# Patient Record
Sex: MALE | Race: WHITE | Employment: FULL TIME | URBAN - METROPOLITAN AREA
[De-identification: names, ages, dates, MRNs, and addresses within clinical notes are randomized per-mention and may not be internally consistent; named-entity substitution may affect disease eponyms.]

---

## 2017-03-28 ENCOUNTER — OFFICE VISIT (OUTPATIENT)
Dept: FAMILY MEDICINE CLINIC | Facility: CLINIC | Age: 52
End: 2017-03-28

## 2017-03-28 VITALS
WEIGHT: 189.38 LBS | BODY MASS INDEX: 25.65 KG/M2 | DIASTOLIC BLOOD PRESSURE: 70 MMHG | SYSTOLIC BLOOD PRESSURE: 110 MMHG | HEART RATE: 72 BPM | RESPIRATION RATE: 16 BRPM | HEIGHT: 72 IN | TEMPERATURE: 98 F

## 2017-03-28 DIAGNOSIS — H91.92 DECREASED HEARING, LEFT: Primary | ICD-10-CM

## 2017-03-28 DIAGNOSIS — H66.002 ACUTE SUPPURATIVE OTITIS MEDIA OF LEFT EAR WITHOUT SPONTANEOUS RUPTURE OF TYMPANIC MEMBRANE, RECURRENCE NOT SPECIFIED: ICD-10-CM

## 2017-03-28 DIAGNOSIS — H65.92 OME (OTITIS MEDIA WITH EFFUSION), LEFT: ICD-10-CM

## 2017-03-28 PROCEDURE — 99213 OFFICE O/P EST LOW 20 MIN: CPT | Performed by: FAMILY MEDICINE

## 2017-03-28 RX ORDER — AMOXICILLIN AND CLAVULANATE POTASSIUM 875; 125 MG/1; MG/1
1 TABLET, FILM COATED ORAL 2 TIMES DAILY
Qty: 14 TABLET | Refills: 0 | Status: SHIPPED | OUTPATIENT
Start: 2017-03-28 | End: 2017-04-04

## 2017-03-28 NOTE — PROGRESS NOTES
HPI:   Sindhu Frances is a 46year old male who presents for upper respiratory symptoms for 10 days. Symptoms include started with irritation and itching in the left ear, then got stuffy and when he blew his nose he felt and heard \"gurgling\" in his ear.  Richard Rodegrs adenopathy    ASSESSMENT AND PLAN:   Sindhu Frances is a 46year old male who presents with L decreased hearing d/t L OME with 1/2 AOM. PLAN:   Treat with abx; otc sudafed x 1 weeks, flonase x 2 weeks; let us know if not resolved in 2 weeks;    Rec pt schedu

## 2017-07-19 ENCOUNTER — OFFICE VISIT (OUTPATIENT)
Dept: FAMILY MEDICINE CLINIC | Facility: CLINIC | Age: 52
End: 2017-07-19

## 2017-07-19 VITALS
TEMPERATURE: 98 F | RESPIRATION RATE: 16 BRPM | WEIGHT: 184 LBS | HEART RATE: 72 BPM | SYSTOLIC BLOOD PRESSURE: 120 MMHG | HEIGHT: 72 IN | DIASTOLIC BLOOD PRESSURE: 84 MMHG | BODY MASS INDEX: 24.92 KG/M2

## 2017-07-19 DIAGNOSIS — Z72.0 TOBACCO USE: ICD-10-CM

## 2017-07-19 DIAGNOSIS — N52.9 ERECTILE DYSFUNCTION, UNSPECIFIED ERECTILE DYSFUNCTION TYPE: ICD-10-CM

## 2017-07-19 DIAGNOSIS — K92.1 HEMATOCHEZIA: ICD-10-CM

## 2017-07-19 DIAGNOSIS — Z00.00 WELL ADULT EXAM: Primary | ICD-10-CM

## 2017-07-19 DIAGNOSIS — D22.9 ATYPICAL MOLE: ICD-10-CM

## 2017-07-19 PROCEDURE — 99396 PREV VISIT EST AGE 40-64: CPT | Performed by: FAMILY MEDICINE

## 2017-07-19 NOTE — PROGRESS NOTES
Shelia Garber is a 46year old male. CC:  Patient presents with:   Well Adult: per pt      HPI:  Yearly PX  Last lipid: > 1 year    Immunization History  Administered            Date(s) Administered    TDAP                  01/23/2012     Last PSA: apparent distress  EYE: B conjunctiva and lids normal  HENT: normocephalic; normal nose, pharynx and TM's  NECK: No lymphadenopathy, thyromegaly or masses  CAR: S1, S2 normal, RRR; no S3, no S4; no click; murmur negative  PULM: clear to auscultation B, no Lipid Panel [E]      PSA (Screening) [E]    None    Meds & Refills for this Visit:  No prescriptions requested or ordered in this encounter      No Follow-up on file.                 Authorized by Williams Santana M.D.

## 2017-07-26 ENCOUNTER — NURSE ONLY (OUTPATIENT)
Dept: FAMILY MEDICINE CLINIC | Facility: CLINIC | Age: 52
End: 2017-07-26

## 2017-07-26 DIAGNOSIS — N52.9 ERECTILE DYSFUNCTION, UNSPECIFIED ERECTILE DYSFUNCTION TYPE: ICD-10-CM

## 2017-07-26 DIAGNOSIS — Z00.00 WELL ADULT EXAM: ICD-10-CM

## 2017-07-26 LAB
ALBUMIN SERPL-MCNC: 3.8 G/DL (ref 3.5–4.8)
ALP LIVER SERPL-CCNC: 116 U/L (ref 45–117)
ALT SERPL-CCNC: 27 U/L (ref 17–63)
AST SERPL-CCNC: 20 U/L (ref 15–41)
BILIRUB SERPL-MCNC: 1 MG/DL (ref 0.1–2)
BUN BLD-MCNC: 13 MG/DL (ref 8–20)
CALCIUM BLD-MCNC: 9.1 MG/DL (ref 8.3–10.3)
CHLORIDE: 111 MMOL/L (ref 101–111)
CHOLEST SMN-MCNC: 143 MG/DL (ref ?–200)
CO2: 27 MMOL/L (ref 22–32)
COMPLEXED PSA SERPL-MCNC: 0.55 NG/ML (ref 0.01–4)
CREAT BLD-MCNC: 0.81 MG/DL (ref 0.7–1.3)
ERYTHROCYTE [DISTWIDTH] IN BLOOD BY AUTOMATED COUNT: 13.4 % (ref 11.5–16)
GLUCOSE BLD-MCNC: 76 MG/DL (ref 70–99)
HCT VFR BLD AUTO: 43.5 % (ref 37–53)
HDLC SERPL-MCNC: 35 MG/DL (ref 45–?)
HDLC SERPL: 4.09 {RATIO} (ref ?–4.97)
HGB BLD-MCNC: 16 G/DL (ref 13–17)
LDLC SERPL CALC-MCNC: 81 MG/DL (ref ?–130)
LDLC SERPL-MCNC: 27 MG/DL (ref 5–40)
M PROTEIN MFR SERPL ELPH: 6.7 G/DL (ref 6.1–8.3)
MCH RBC QN AUTO: 34.3 PG (ref 27–33.2)
MCHC RBC AUTO-ENTMCNC: 36.8 G/DL (ref 31–37)
MCV RBC AUTO: 93.1 FL (ref 80–99)
NONHDLC SERPL-MCNC: 108 MG/DL (ref ?–130)
PLATELET # BLD AUTO: 266 10(3)UL (ref 150–450)
POTASSIUM SERPL-SCNC: 3.8 MMOL/L (ref 3.6–5.1)
RBC # BLD AUTO: 4.67 X10(6)UL (ref 4.3–5.7)
RED CELL DISTRIBUTION WIDTH-SD: 45.2 FL (ref 35.1–46.3)
SODIUM SERPL-SCNC: 145 MMOL/L (ref 136–144)
TRIGLYCERIDES: 134 MG/DL (ref ?–150)
TSI SER-ACNC: 2.17 MIU/ML (ref 0.35–5.5)
WBC # BLD AUTO: 11.2 X10(3) UL (ref 4–13)

## 2017-07-26 PROCEDURE — 80050 GENERAL HEALTH PANEL: CPT | Performed by: FAMILY MEDICINE

## 2017-07-26 PROCEDURE — 80061 LIPID PANEL: CPT | Performed by: FAMILY MEDICINE

## 2017-07-26 PROCEDURE — 36415 COLL VENOUS BLD VENIPUNCTURE: CPT | Performed by: FAMILY MEDICINE

## 2017-07-26 PROCEDURE — 84153 ASSAY OF PSA TOTAL: CPT | Performed by: FAMILY MEDICINE

## 2017-08-22 ENCOUNTER — OFFICE VISIT (OUTPATIENT)
Dept: FAMILY MEDICINE CLINIC | Facility: CLINIC | Age: 52
End: 2017-08-22

## 2017-08-22 VITALS
RESPIRATION RATE: 18 BRPM | HEIGHT: 73 IN | HEART RATE: 80 BPM | DIASTOLIC BLOOD PRESSURE: 70 MMHG | BODY MASS INDEX: 24.57 KG/M2 | WEIGHT: 185.38 LBS | SYSTOLIC BLOOD PRESSURE: 120 MMHG | TEMPERATURE: 98 F

## 2017-08-22 DIAGNOSIS — K92.1 HEMATOCHEZIA: Primary | ICD-10-CM

## 2017-08-22 PROCEDURE — 99243 OFF/OP CNSLTJ NEW/EST LOW 30: CPT | Performed by: SURGERY

## 2017-08-22 NOTE — H&P
Wilmer Player is a 46year old male  Patient presents with:  Consult: consult for colonoscopy . . room 5       REFERRED BY    Patient presents with    BRBPR and hematochezia for the past two years  Pt sees blood   Weight loss   Family h/o of colon polyps bleeding  Endocrine: no weight gain or loss no hot or cold intolerance    EXAM     Blood pressure 120/70, pulse 80, temperature 98.2 °F (36.8 °C), temperature source Temporal, resp. rate 18, height 73\", weight 185 lb 6 oz.   GENERAL: well developed, well n - 53.0 % Final   • PLT 07/26/2017 266.0  150.0 - 450.0 10(3)uL Final   • MCV 07/26/2017 93.1  80.0 - 99.0 fL Final   • MCH 07/26/2017 34.3* 27.0 - 33.2 pg Final   • MCHC 07/26/2017 36.8  31.0 - 37.0 g/dL Final   • RDW 07/26/2017 13.4  11.5 - 16.0 % Final

## 2017-08-23 ENCOUNTER — TELEPHONE (OUTPATIENT)
Dept: SURGERY | Facility: CLINIC | Age: 52
End: 2017-08-23

## 2017-08-23 ENCOUNTER — TELEPHONE (OUTPATIENT)
Dept: FAMILY MEDICINE CLINIC | Facility: CLINIC | Age: 52
End: 2017-08-23

## 2017-08-23 DIAGNOSIS — K92.1 HEMATOCHEZIA: Primary | ICD-10-CM

## 2017-08-23 NOTE — TELEPHONE ENCOUNTER
CPT:  55850  DX:  K92.1    I called bc/bs and spoke with Padmini Buchanan. There is NO PRIOR AUTH REQUIRED FOR OUTPATIENT COLONOSCOPY.     Ref Number:  [id number]

## 2017-08-25 RX ORDER — DOXEPIN HYDROCHLORIDE 50 MG/1
1 CAPSULE ORAL DAILY
COMMUNITY

## 2017-09-13 ENCOUNTER — ANESTHESIA EVENT (OUTPATIENT)
Dept: ENDOSCOPY | Facility: HOSPITAL | Age: 52
End: 2017-09-13
Payer: COMMERCIAL

## 2017-09-13 ENCOUNTER — SURGERY (OUTPATIENT)
Age: 52
End: 2017-09-13

## 2017-09-13 ENCOUNTER — ANESTHESIA (OUTPATIENT)
Dept: ENDOSCOPY | Facility: HOSPITAL | Age: 52
End: 2017-09-13
Payer: COMMERCIAL

## 2017-09-13 ENCOUNTER — HOSPITAL ENCOUNTER (OUTPATIENT)
Facility: HOSPITAL | Age: 52
Setting detail: HOSPITAL OUTPATIENT SURGERY
Discharge: HOME | End: 2017-09-13
Attending: SURGERY | Admitting: SURGERY
Payer: COMMERCIAL

## 2017-09-13 VITALS
SYSTOLIC BLOOD PRESSURE: 132 MMHG | WEIGHT: 175 LBS | BODY MASS INDEX: 23.7 KG/M2 | HEIGHT: 72 IN | OXYGEN SATURATION: 100 % | HEART RATE: 66 BPM | DIASTOLIC BLOOD PRESSURE: 83 MMHG | RESPIRATION RATE: 18 BRPM | TEMPERATURE: 98 F

## 2017-09-13 DIAGNOSIS — K92.1 HEMATOCHEZIA: ICD-10-CM

## 2017-09-13 PROCEDURE — 0DBH8ZX EXCISION OF CECUM, VIA NATURAL OR ARTIFICIAL OPENING ENDOSCOPIC, DIAGNOSTIC: ICD-10-PCS | Performed by: SURGERY

## 2017-09-13 PROCEDURE — 0DBN8ZX EXCISION OF SIGMOID COLON, VIA NATURAL OR ARTIFICIAL OPENING ENDOSCOPIC, DIAGNOSTIC: ICD-10-PCS | Performed by: SURGERY

## 2017-09-13 PROCEDURE — 88305 TISSUE EXAM BY PATHOLOGIST: CPT | Performed by: SURGERY

## 2017-09-13 PROCEDURE — 0DBK8ZX EXCISION OF ASCENDING COLON, VIA NATURAL OR ARTIFICIAL OPENING ENDOSCOPIC, DIAGNOSTIC: ICD-10-PCS | Performed by: SURGERY

## 2017-09-13 RX ORDER — SODIUM CHLORIDE, SODIUM LACTATE, POTASSIUM CHLORIDE, CALCIUM CHLORIDE 600; 310; 30; 20 MG/100ML; MG/100ML; MG/100ML; MG/100ML
INJECTION, SOLUTION INTRAVENOUS CONTINUOUS
Status: DISCONTINUED | OUTPATIENT
Start: 2017-09-13 | End: 2017-09-13

## 2017-09-13 NOTE — OPERATIVE REPORT
[facility]    PATIENT'S NAME: Rolo Choin   ATTENDING PHYSICIAN: Crystal Steiner D.O.   OPERATING PHYSICIAN: Crystal Steiner D.O.   PATIENT ACCOUNT#:   [de-identified]    LOCATION:  [facility] ROOMS 1 ED [address] #:   [id number] slowly withdrawn into the remainder of the sigmoid colon and rectum demonstrating no further evidence of mucosal disease. The scope was then withdrawn. The patient tolerated the procedure well.      Dictated By Nancy Layton D.O.  d: 09/13/2017 08:54:49

## 2017-09-13 NOTE — ANESTHESIA POSTPROCEDURE EVALUATION
[facility] Patient Status:  Hospital [facility]patient Surgery   Age/Gender 46year old male MRN [id number]   Location [address] [facility]3]. Attending Maia Perez, [address] [facility] Day # 0 PCP Tatiana Kruse MD       Anesthesia Post-op Not

## 2017-09-13 NOTE — ANESTHESIA PREPROCEDURE EVALUATION
PRE-OP EVALUATION    Patient Name: Tyrel Felix    Pre-op Diagnosis: screening    Procedure(s):  COLONOSCOPY    Surgeon(s) and Role:     Lisa High,  - Primary    Pre-op vitals reviewed.   Temp: 97.7 °F (36.5 °C)  Pulse: 69  Resp: 18  BP: 115/77 history. Pulmonary    Pulmonary exam normal.  Breath sounds clear to auscultation bilaterally. Other findings            ASA: 1   Plan: MAC  NPO status verified and patient meets guidelines.           Plan/risks discussed with: patient

## 2017-09-13 NOTE — H&P
Patient presents with    BRBPR and hematochezia for the past two years  Pt sees blood   Weight loss   Family h/o of colon polyps yes father    No mucus   No change in bowel habits   Has never had a colonoscopy   Pt states he sees clots in the stool   Will pulse 80, temperature 98.2 °F (36.8 °C), temperature source Temporal, resp. rate 18, height 73\", weight 185 lb 6 oz. GENERAL: well developed, well nourished male, in no apparent distress.     MENTAL STATUS :Alert, oriented x 3  PSYCH: normal mood and affe 80.0 - 99.0 fL Final   • MCH 07/26/2017 34.3* 27.0 - 33.2 pg Final   • MCHC 07/26/2017 36.8  31.0 - 37.0 g/dL Final   • RDW 07/26/2017 13.4  11.5 - 16.0 % Final   • RDW-SD 07/26/2017 45.2  35.1 - 46.3 fL Final   • TSH 07/26/2017 2.170  0.350 - 5.500 mIU/mL

## 2019-07-23 ENCOUNTER — TELEPHONE (OUTPATIENT)
Dept: FAMILY MEDICINE CLINIC | Facility: CLINIC | Age: 54
End: 2019-07-23

## 2019-07-23 NOTE — TELEPHONE ENCOUNTER
This was a pt of MATHIEU but moved. He is now back in the area and would like to see [facility] again. Will MATHIEU accept?

## 2019-09-10 ENCOUNTER — OFFICE VISIT (OUTPATIENT)
Dept: FAMILY MEDICINE CLINIC | Facility: CLINIC | Age: 54
End: 2019-09-10
Payer: COMMERCIAL

## 2019-09-10 VITALS
BODY MASS INDEX: 25.27 KG/M2 | HEART RATE: 62 BPM | SYSTOLIC BLOOD PRESSURE: 116 MMHG | TEMPERATURE: 99 F | OXYGEN SATURATION: 98 % | DIASTOLIC BLOOD PRESSURE: 76 MMHG | HEIGHT: 72.25 IN | RESPIRATION RATE: 14 BRPM | WEIGHT: 188.63 LBS

## 2019-09-10 DIAGNOSIS — D22.9 ATYPICAL MOLE: ICD-10-CM

## 2019-09-10 DIAGNOSIS — K63.5 POLYP OF COLON, UNSPECIFIED PART OF COLON, UNSPECIFIED TYPE: ICD-10-CM

## 2019-09-10 DIAGNOSIS — Z00.00 WELL ADULT EXAM: Primary | ICD-10-CM

## 2019-09-10 DIAGNOSIS — K92.1 HEMATOCHEZIA: ICD-10-CM

## 2019-09-10 DIAGNOSIS — F17.200 TOBACCO DEPENDENCE: ICD-10-CM

## 2019-09-10 LAB
ALBUMIN SERPL-MCNC: 4.3 G/DL (ref 3.4–5)
ALBUMIN/GLOB SERPL: 1.3 {RATIO} (ref 1–2)
ALP LIVER SERPL-CCNC: 107 U/L (ref 45–117)
ALT SERPL-CCNC: 27 U/L (ref 16–61)
ANION GAP SERPL CALC-SCNC: 2 MMOL/L (ref 0–18)
AST SERPL-CCNC: 20 U/L (ref 15–37)
BILIRUB SERPL-MCNC: 1.1 MG/DL (ref 0.1–2)
BUN BLD-MCNC: 15 MG/DL (ref 7–18)
BUN/CREAT SERPL: 17.2 (ref 10–20)
CALCIUM BLD-MCNC: 9.6 MG/DL (ref 8.5–10.1)
CHLORIDE SERPL-SCNC: 109 MMOL/L (ref 98–112)
CHOLEST SMN-MCNC: 177 MG/DL (ref ?–200)
CO2 SERPL-SCNC: 29 MMOL/L (ref 21–32)
COMPLEXED PSA SERPL-MCNC: 0.67 NG/ML (ref ?–4)
CREAT BLD-MCNC: 0.87 MG/DL (ref 0.7–1.3)
DEPRECATED RDW RBC AUTO: 43.1 FL (ref 35.1–46.3)
ERYTHROCYTE [DISTWIDTH] IN BLOOD BY AUTOMATED COUNT: 13 % (ref 11–15)
GLOBULIN PLAS-MCNC: 3.2 G/DL (ref 2.8–4.4)
GLUCOSE BLD-MCNC: 79 MG/DL (ref 70–99)
HCT VFR BLD AUTO: 46.9 % (ref 39–53)
HDLC SERPL-MCNC: 35 MG/DL (ref 40–59)
HGB BLD-MCNC: 17.4 G/DL (ref 13–17.5)
LDLC SERPL CALC-MCNC: 93 MG/DL (ref ?–100)
M PROTEIN MFR SERPL ELPH: 7.5 G/DL (ref 6.4–8.2)
MCH RBC QN AUTO: 34.5 PG (ref 26–34)
MCHC RBC AUTO-ENTMCNC: 37.1 G/DL (ref 31–37)
MCV RBC AUTO: 92.9 FL (ref 80–100)
NONHDLC SERPL-MCNC: 142 MG/DL (ref ?–130)
OSMOLALITY SERPL CALC.SUM OF ELEC: 290 MOSM/KG (ref 275–295)
PLATELET # BLD AUTO: 293 10(3)UL (ref 150–450)
POTASSIUM SERPL-SCNC: 4.6 MMOL/L (ref 3.5–5.1)
RBC # BLD AUTO: 5.05 X10(6)UL (ref 4.3–5.7)
SODIUM SERPL-SCNC: 140 MMOL/L (ref 136–145)
TRIGL SERPL-MCNC: 243 MG/DL (ref 30–149)
TSI SER-ACNC: 1.86 MIU/ML (ref 0.36–3.74)
VLDLC SERPL CALC-MCNC: 49 MG/DL (ref 0–30)
WBC # BLD AUTO: 9.9 X10(3) UL (ref 4–11)

## 2019-09-10 PROCEDURE — 80050 GENERAL HEALTH PANEL: CPT | Performed by: FAMILY MEDICINE

## 2019-09-10 PROCEDURE — 99396 PREV VISIT EST AGE 40-64: CPT | Performed by: FAMILY MEDICINE

## 2019-09-10 PROCEDURE — 36415 COLL VENOUS BLD VENIPUNCTURE: CPT | Performed by: FAMILY MEDICINE

## 2019-09-10 PROCEDURE — 80061 LIPID PANEL: CPT | Performed by: FAMILY MEDICINE

## 2019-09-10 PROCEDURE — 84153 ASSAY OF PSA TOTAL: CPT | Performed by: FAMILY MEDICINE

## 2019-09-10 RX ORDER — VARENICLINE TARTRATE 1 MG/1
1 TABLET, FILM COATED ORAL 2 TIMES DAILY
Qty: 60 TABLET | Refills: 5 | Status: SHIPPED | OUTPATIENT
Start: 2019-09-10 | End: 2019-09-26

## 2019-09-10 NOTE — PROGRESS NOTES
Alina Herrera is a 47year old male.     CC:  Patient presents with:  Physical: per pt- follow up on colonoscopy       HPI:  Yearly PX  Last lipid:  Lab Results   Component Value Date    CHOLEST 143 07/26/2017    TRIG 134 07/26/2017    HDL 35 (L) 07/26 ROS:  General: energy level stable  Cardiovascular/Pulses: Denies palpitations, tachycardia, irregular heart beat, chest pain, exertional chest pain or pressure, paroxysmal nocturnal dyspnea  Respiratory: Denies dyspnea, dyspnea on exertion  GI: occasi [E]      PSA (Screening) [E]      TSH W Reflex To Free T4 [E]      *Venipuncture      Orders Placed This Encounter      CBC, Platelet, No Differential [E]          Standing Status: Future          Number of Occurrences: 1          Standing Expiration Date:

## 2019-09-26 ENCOUNTER — TELEPHONE (OUTPATIENT)
Dept: FAMILY MEDICINE CLINIC | Facility: CLINIC | Age: 54
End: 2019-09-26

## 2019-09-26 RX ORDER — BUPROPION HYDROCHLORIDE 150 MG/1
TABLET, EXTENDED RELEASE ORAL
Qty: 60 TABLET | Refills: 3 | Status: SHIPPED | OUTPATIENT
Start: 2019-09-26 | End: 2019-11-07

## 2019-09-26 NOTE — TELEPHONE ENCOUNTER
Can try Wellbutrin  mg, 1 tab qd x 3 days, then bid, #60, 3 rf. Try to stop smoking one week after starting the med.   Thanks   Stop Chantix  Thanks

## 2019-09-26 NOTE — TELEPHONE ENCOUNTER
PT CALLED AND ADV THAT HE WENT TO GO P/U THE CHANTIX AND WAS EXTREMELY EXPENSIVE. Mckay Horne   PT ADV THAT THERE WAS SOME TALK ABOUT CALLING IN WELLBUTRIN    PLEASE ADV    [place]

## 2019-10-02 ENCOUNTER — OFFICE VISIT (OUTPATIENT)
Dept: SURGERY | Facility: CLINIC | Age: 54
End: 2019-10-02
Payer: COMMERCIAL

## 2019-10-02 VITALS
HEART RATE: 75 BPM | HEIGHT: 72 IN | SYSTOLIC BLOOD PRESSURE: 133 MMHG | RESPIRATION RATE: 18 BRPM | WEIGHT: 180 LBS | TEMPERATURE: 98 F | BODY MASS INDEX: 24.38 KG/M2 | DIASTOLIC BLOOD PRESSURE: 82 MMHG

## 2019-10-02 DIAGNOSIS — K63.5 POLYP OF COLON, UNSPECIFIED PART OF COLON, UNSPECIFIED TYPE: Primary | ICD-10-CM

## 2019-10-02 PROCEDURE — 99243 OFF/OP CNSLTJ NEW/EST LOW 30: CPT | Performed by: SURGERY

## 2019-10-02 RX ORDER — POLYETHYLENE GLYCOL 3350, SODIUM CHLORIDE, SODIUM BICARBONATE, POTASSIUM CHLORIDE 420; 11.2; 5.72; 1.48 G/4L; G/4L; G/4L; G/4L
POWDER, FOR SOLUTION ORAL
Qty: 1 BOTTLE | Refills: 0 | Status: SHIPPED | OUTPATIENT
Start: 2019-10-02 | End: 2020-08-22

## 2019-10-02 NOTE — H&P
New Patient Visit Note       Active Problems      No diagnosis found. Chief Complaint   Patient presents with:  Colon Problem: New/est pt ref by Dr. Hiwot Ying for colon polyp-colonoscopy overdue.        History of Present Illness   Patient presents for col Drug use: No       Current Outpatient Medications:   •  buPROPion HCl ER, SR, (WELLBUTRIN SR) 150 MG Oral Tablet 12 Hr, Take 1 tablet daily for 3 days and then take one tablet twice daily, Disp: 60 tablet, Rfl: 3  •  multivitamin Oral Tab, Take 1 tab muscle wasting. No gross deformities. Normal strength. Neurologic. Patient alert and oriented x 3. Cranial nerves 2-12 grossly intact. No focal sensor or motor deficits. Psychiatric. Normal mood and appropriate affect. Skin. No jaundice.   Normal

## 2019-11-06 ENCOUNTER — TELEPHONE (OUTPATIENT)
Dept: SURGERY | Facility: CLINIC | Age: 54
End: 2019-11-06

## 2019-11-13 ENCOUNTER — ANESTHESIA (OUTPATIENT)
Dept: ENDOSCOPY | Facility: HOSPITAL | Age: 54
End: 2019-11-13
Payer: COMMERCIAL

## 2019-11-13 ENCOUNTER — ANESTHESIA EVENT (OUTPATIENT)
Dept: ENDOSCOPY | Facility: HOSPITAL | Age: 54
End: 2019-11-13
Payer: COMMERCIAL

## 2019-11-13 ENCOUNTER — HOSPITAL ENCOUNTER (OUTPATIENT)
Facility: HOSPITAL | Age: 54
Setting detail: HOSPITAL OUTPATIENT SURGERY
Discharge: HOME | End: 2019-11-13
Attending: SURGERY | Admitting: SURGERY
Payer: COMMERCIAL

## 2019-11-13 VITALS
HEIGHT: 72 IN | TEMPERATURE: 98 F | SYSTOLIC BLOOD PRESSURE: 126 MMHG | BODY MASS INDEX: 24.38 KG/M2 | DIASTOLIC BLOOD PRESSURE: 88 MMHG | WEIGHT: 180 LBS | OXYGEN SATURATION: 97 % | RESPIRATION RATE: 18 BRPM | HEART RATE: 72 BPM

## 2019-11-13 DIAGNOSIS — K63.5 POLYP OF COLON, UNSPECIFIED PART OF COLON, UNSPECIFIED TYPE: ICD-10-CM

## 2019-11-13 PROCEDURE — 0DBM8ZX EXCISION OF DESCENDING COLON, VIA NATURAL OR ARTIFICIAL OPENING ENDOSCOPIC, DIAGNOSTIC: ICD-10-PCS | Performed by: SURGERY

## 2019-11-13 PROCEDURE — 88305 TISSUE EXAM BY PATHOLOGIST: CPT | Performed by: SURGERY

## 2019-11-13 RX ORDER — LIDOCAINE HYDROCHLORIDE 10 MG/ML
INJECTION, SOLUTION EPIDURAL; INFILTRATION; INTRACAUDAL; PERINEURAL AS NEEDED
Status: DISCONTINUED | OUTPATIENT
Start: 2019-11-13 | End: 2019-11-13 | Stop reason: SURG

## 2019-11-13 RX ORDER — SODIUM CHLORIDE, SODIUM LACTATE, POTASSIUM CHLORIDE, CALCIUM CHLORIDE 600; 310; 30; 20 MG/100ML; MG/100ML; MG/100ML; MG/100ML
INJECTION, SOLUTION INTRAVENOUS CONTINUOUS
Status: DISCONTINUED | OUTPATIENT
Start: 2019-11-13 | End: 2019-11-13

## 2019-11-13 RX ADMIN — SODIUM CHLORIDE, SODIUM LACTATE, POTASSIUM CHLORIDE, CALCIUM CHLORIDE: 600; 310; 30; 20 INJECTION, SOLUTION INTRAVENOUS at 08:14:00

## 2019-11-13 RX ADMIN — LIDOCAINE HYDROCHLORIDE 50 MG: 10 INJECTION, SOLUTION EPIDURAL; INFILTRATION; INTRACAUDAL; PERINEURAL at 08:04:00

## 2019-11-13 RX ADMIN — SODIUM CHLORIDE, SODIUM LACTATE, POTASSIUM CHLORIDE, CALCIUM CHLORIDE: 600; 310; 30; 20 INJECTION, SOLUTION INTRAVENOUS at 08:01:00

## 2019-11-13 NOTE — ANESTHESIA PREPROCEDURE EVALUATION
PRE-OP EVALUATION    Patient Name: Paulo Conrad    Pre-op Diagnosis: Polyp of colon, unspecified part of colon, unspecified type [K63.5]    Procedure(s):  COLONOSCOPY    Surgeon(s) and Role:     Familia Goodman, DO - Primary    Pre-op vitals reviewed. 09/10/2019    RDW 13.0 09/10/2019    .0 09/10/2019     Lab Results   Component Value Date     09/10/2019    K 4.6 09/10/2019     09/10/2019    CO2 29.0 09/10/2019    BUN 15 09/10/2019    CREATSERUM 0.87 09/10/2019    GLU 79 09/10/2019

## 2019-11-13 NOTE — ANESTHESIA POSTPROCEDURE EVALUATION
[facility] Patient Status:  Hospital Outpatient Surgery   Age/Gender 47year old male MRN [id number]   Location [address] [facility]. Attending Calos Cárdenas, [address] [facility] Day # 0 PCP Alban Grubbs MD       Anesthesia Post-op Not

## 2019-11-13 NOTE — OPERATIVE REPORT
[address]    PATIENT'S NAME: Lawanda Rodriguez   ATTENDING PHYSICIAN: Olga Mercedes D.O.   OPERATING PHYSICIAN: Olga Mercedes D.O.   PATIENT ACCOUNT#:   [id number]    LOCATION:  [facility] ROOMS 8 EDWP  MEDICAL RECORD #:   [id number]       CHERYL

## 2020-08-22 ENCOUNTER — TELEPHONE (OUTPATIENT)
Dept: FAMILY MEDICINE CLINIC | Facility: CLINIC | Age: 55
End: 2020-08-22

## 2020-08-22 ENCOUNTER — TELEMEDICINE (OUTPATIENT)
Dept: FAMILY MEDICINE CLINIC | Facility: CLINIC | Age: 55
End: 2020-08-22

## 2020-08-22 DIAGNOSIS — Z20.822 CLOSE EXPOSURE TO COVID-19 VIRUS: ICD-10-CM

## 2020-08-22 DIAGNOSIS — R51.9 HEADACHE DISORDER: ICD-10-CM

## 2020-08-22 DIAGNOSIS — R05.9 COUGH: ICD-10-CM

## 2020-08-22 DIAGNOSIS — R50.9 FEVER, UNSPECIFIED FEVER CAUSE: Primary | ICD-10-CM

## 2020-08-22 PROCEDURE — 99213 OFFICE O/P EST LOW 20 MIN: CPT | Performed by: FAMILY MEDICINE

## 2020-08-22 RX ORDER — AZITHROMYCIN 250 MG/1
TABLET, FILM COATED ORAL
Qty: 6 TABLET | Refills: 0 | Status: SHIPPED | OUTPATIENT
Start: 2020-08-22 | End: 2020-08-27

## 2020-08-22 NOTE — TELEPHONE ENCOUNTER
Cough, fever 100.2 , very congested started yesterday.  There have been co workers with covid but not in the last few weeks  He is asking to be seen

## 2020-08-22 NOTE — PROGRESS NOTES
My Chart/ Video/Telephone Visit Check-In Due to [address] [facility] verbally consents to a [employer] on 08/22/20. He was unable to open the ziggy for a video visit.   Patient understands and accepts financial responsibility fo conversing with me  Psych: Alert and oriented x 3, speech was not pressured  Resp: No respiratory distress noted when conversing with me, able to speak in full sentences. ASSESSMENT AND PLAN    1.  Fever, unspecified fever cause  Rest, push fluids  Tyle

## 2020-08-22 NOTE — TELEPHONE ENCOUNTER
[address] verbally consents to a Virtual/Telephone Check-In service on 8/22/20  Patient understands and accepts financial responsibility for any deductible, co-insurance and/or co-pays associated with this service

## 2020-08-24 ENCOUNTER — APPOINTMENT (OUTPATIENT)
Dept: LAB | Age: 55
End: 2020-08-24
Attending: FAMILY MEDICINE
Payer: COMMERCIAL

## 2020-08-24 DIAGNOSIS — R51.9 HEADACHE DISORDER: ICD-10-CM

## 2020-08-24 DIAGNOSIS — R50.9 FEVER, UNSPECIFIED FEVER CAUSE: ICD-10-CM

## 2020-08-24 DIAGNOSIS — Z20.822 CLOSE EXPOSURE TO COVID-19 VIRUS: ICD-10-CM

## 2020-08-24 DIAGNOSIS — R05.9 COUGH: ICD-10-CM

## 2020-08-26 LAB — SARS-COV-2 RNA RESP QL NAA+PROBE: NOT DETECTED

## 2020-09-08 ENCOUNTER — TELEPHONE (OUTPATIENT)
Dept: FAMILY MEDICINE CLINIC | Facility: CLINIC | Age: 55
End: 2020-09-08

## 2020-09-08 NOTE — TELEPHONE ENCOUNTER
He needs a signature on his union paperwork. Let him  Know that Doctor will not be here until Monday .

## 2020-09-16 ENCOUNTER — TELEPHONE (OUTPATIENT)
Dept: FAMILY MEDICINE CLINIC | Facility: CLINIC | Age: 55
End: 2020-09-16

## 2020-09-16 ENCOUNTER — MED REC SCAN ONLY (OUTPATIENT)
Dept: FAMILY MEDICINE CLINIC | Facility: CLINIC | Age: 55
End: 2020-09-16

## 2020-09-16 NOTE — TELEPHONE ENCOUNTER
Patient advised that the form has been completed by Dr. Deana Whelan and ready for . Advised patient to call from the car and the forms can be taken out to him.

## 2020-09-29 ENCOUNTER — OFFICE VISIT (OUTPATIENT)
Dept: FAMILY MEDICINE CLINIC | Facility: CLINIC | Age: 55
End: 2020-09-29
Payer: COMMERCIAL

## 2020-09-29 ENCOUNTER — LABORATORY ENCOUNTER (OUTPATIENT)
Dept: LAB | Age: 55
End: 2020-09-29
Attending: FAMILY MEDICINE
Payer: COMMERCIAL

## 2020-09-29 VITALS
WEIGHT: 184 LBS | HEIGHT: 72 IN | SYSTOLIC BLOOD PRESSURE: 110 MMHG | TEMPERATURE: 98 F | DIASTOLIC BLOOD PRESSURE: 70 MMHG | HEART RATE: 73 BPM | BODY MASS INDEX: 24.92 KG/M2

## 2020-09-29 DIAGNOSIS — Z00.00 WELL ADULT EXAM: ICD-10-CM

## 2020-09-29 DIAGNOSIS — F17.200 TOBACCO DEPENDENCE: ICD-10-CM

## 2020-09-29 DIAGNOSIS — Z00.00 WELL ADULT EXAM: Primary | ICD-10-CM

## 2020-09-29 LAB
ALT SERPL-CCNC: 28 U/L
ANION GAP SERPL CALC-SCNC: 2 MMOL/L (ref 0–18)
AST SERPL-CCNC: 19 U/L (ref 15–37)
BUN BLD-MCNC: 12 MG/DL (ref 7–18)
BUN/CREAT SERPL: 13.5 (ref 10–20)
CALCIUM BLD-MCNC: 9.4 MG/DL (ref 8.5–10.1)
CHLORIDE SERPL-SCNC: 111 MMOL/L (ref 98–112)
CHOLEST SMN-MCNC: 152 MG/DL (ref ?–200)
CO2 SERPL-SCNC: 30 MMOL/L (ref 21–32)
COMPLEXED PSA SERPL-MCNC: 0.8 NG/ML (ref ?–4)
CREAT BLD-MCNC: 0.89 MG/DL
DEPRECATED RDW RBC AUTO: 45.5 FL (ref 35.1–46.3)
ERYTHROCYTE [DISTWIDTH] IN BLOOD BY AUTOMATED COUNT: 13.4 % (ref 11–15)
GLUCOSE BLD-MCNC: 77 MG/DL (ref 70–99)
HCT VFR BLD AUTO: 47.1 %
HDLC SERPL-MCNC: 40 MG/DL (ref 40–59)
HGB BLD-MCNC: 16.8 G/DL
LDLC SERPL CALC-MCNC: 79 MG/DL (ref ?–100)
MCH RBC QN AUTO: 33.7 PG (ref 26–34)
MCHC RBC AUTO-ENTMCNC: 35.7 G/DL (ref 31–37)
MCV RBC AUTO: 94.4 FL
NONHDLC SERPL-MCNC: 112 MG/DL (ref ?–130)
OSMOLALITY SERPL CALC.SUM OF ELEC: 295 MOSM/KG (ref 275–295)
PATIENT FASTING Y/N/NP: YES
PATIENT FASTING Y/N/NP: YES
PLATELET # BLD AUTO: 290 10(3)UL (ref 150–450)
POTASSIUM SERPL-SCNC: 4.9 MMOL/L (ref 3.5–5.1)
RBC # BLD AUTO: 4.99 X10(6)UL
SODIUM SERPL-SCNC: 143 MMOL/L (ref 136–145)
T4 FREE SERPL-MCNC: 1.1 NG/DL (ref 0.8–1.7)
TRIGL SERPL-MCNC: 163 MG/DL (ref 30–149)
TSI SER-ACNC: 1.75 MIU/ML (ref 0.36–3.74)
VLDLC SERPL CALC-MCNC: 33 MG/DL (ref 0–30)
WBC # BLD AUTO: 8 X10(3) UL (ref 4–11)

## 2020-09-29 PROCEDURE — 3074F SYST BP LT 130 MM HG: CPT | Performed by: FAMILY MEDICINE

## 2020-09-29 PROCEDURE — 85027 COMPLETE CBC AUTOMATED: CPT | Performed by: FAMILY MEDICINE

## 2020-09-29 PROCEDURE — 36415 COLL VENOUS BLD VENIPUNCTURE: CPT | Performed by: FAMILY MEDICINE

## 2020-09-29 PROCEDURE — 84153 ASSAY OF PSA TOTAL: CPT | Performed by: FAMILY MEDICINE

## 2020-09-29 PROCEDURE — 84460 ALANINE AMINO (ALT) (SGPT): CPT | Performed by: FAMILY MEDICINE

## 2020-09-29 PROCEDURE — 99396 PREV VISIT EST AGE 40-64: CPT | Performed by: FAMILY MEDICINE

## 2020-09-29 PROCEDURE — 80061 LIPID PANEL: CPT | Performed by: FAMILY MEDICINE

## 2020-09-29 PROCEDURE — 3008F BODY MASS INDEX DOCD: CPT | Performed by: FAMILY MEDICINE

## 2020-09-29 PROCEDURE — 3078F DIAST BP <80 MM HG: CPT | Performed by: FAMILY MEDICINE

## 2020-09-29 PROCEDURE — 84439 ASSAY OF FREE THYROXINE: CPT | Performed by: FAMILY MEDICINE

## 2020-09-29 PROCEDURE — 80048 BASIC METABOLIC PNL TOTAL CA: CPT | Performed by: FAMILY MEDICINE

## 2020-09-29 PROCEDURE — 84450 TRANSFERASE (AST) (SGOT): CPT | Performed by: FAMILY MEDICINE

## 2020-09-29 PROCEDURE — 84443 ASSAY THYROID STIM HORMONE: CPT | Performed by: FAMILY MEDICINE

## 2020-09-29 NOTE — PROGRESS NOTES
Natalie Cuevas is a 54year old male.     CC:  Patient presents with:  Physical      HPI:  Yearly PX    Last lipid:  Lab Results   Component Value Date    CHOLEST 177 09/10/2019    TRIG 243 (H) 09/10/2019    HDL 35 (L) 09/10/2019    LDL 93 09/10/2019 pressure  Respiratory: Denies dyspnea, dyspnea on exertion  GI: Denies hematochezia, melena   (male): Denies hematuria    Vitals: /70   Pulse 73   Temp 98.4 °F (36.9 °C) (Temporal)   Ht 72\"   Wt 184 lb (83.5 kg)   BMI 24.95 kg/m²    Reviewed by BECKA Panel (8) [E]          Standing Status: Future          Number of Occurrences: 1          Standing Expiration Date: 9/29/2021      CBC, Platelet, No Differential [E]          Standing Status: Future          Number of Occurrences: 1          Standing Expir

## 2020-11-17 ENCOUNTER — OFFICE VISIT (OUTPATIENT)
Dept: FAMILY MEDICINE CLINIC | Facility: CLINIC | Age: 55
End: 2020-11-17
Payer: COMMERCIAL

## 2020-11-17 VITALS
WEIGHT: 190 LBS | HEART RATE: 61 BPM | RESPIRATION RATE: 17 BRPM | BODY MASS INDEX: 26 KG/M2 | TEMPERATURE: 98 F | OXYGEN SATURATION: 98 % | SYSTOLIC BLOOD PRESSURE: 110 MMHG | DIASTOLIC BLOOD PRESSURE: 80 MMHG

## 2020-11-17 DIAGNOSIS — M79.671 BILATERAL FOOT PAIN: ICD-10-CM

## 2020-11-17 DIAGNOSIS — M25.552 BILATERAL HIP PAIN: Primary | ICD-10-CM

## 2020-11-17 DIAGNOSIS — M25.551 BILATERAL HIP PAIN: Primary | ICD-10-CM

## 2020-11-17 DIAGNOSIS — M79.672 BILATERAL FOOT PAIN: ICD-10-CM

## 2020-11-17 PROCEDURE — 99213 OFFICE O/P EST LOW 20 MIN: CPT | Performed by: FAMILY MEDICINE

## 2020-11-17 PROCEDURE — 3079F DIAST BP 80-89 MM HG: CPT | Performed by: FAMILY MEDICINE

## 2020-11-17 PROCEDURE — 3074F SYST BP LT 130 MM HG: CPT | Performed by: FAMILY MEDICINE

## 2020-11-17 NOTE — PROGRESS NOTES
Sharron Quiñonez is a 54year old male. CC:  Patient presents with: Worker's Comp: per pt      HPI:  Due to new safety measures at work, he has been required to walk more and take more stairs at work for the past 3 weeks.  He notes that this has cause nourished, in no apparent distress  EYE: B conjunctiva and lids normal  HENT: ---  NECK: No lymphadenopathy, thyromegaly or masses  CAR: S1, S2 normal, RRR; no S3, no S4; no click; murmur negative  PULM: clear to auscultation B, no accessory muscle use  GI

## 2022-09-14 ENCOUNTER — OFFICE VISIT (OUTPATIENT)
Dept: FAMILY MEDICINE CLINIC | Facility: CLINIC | Age: 57
End: 2022-09-14
Payer: COMMERCIAL

## 2022-09-14 VITALS
WEIGHT: 181.81 LBS | OXYGEN SATURATION: 98 % | HEIGHT: 71.5 IN | SYSTOLIC BLOOD PRESSURE: 110 MMHG | DIASTOLIC BLOOD PRESSURE: 68 MMHG | BODY MASS INDEX: 24.9 KG/M2 | TEMPERATURE: 98 F | HEART RATE: 64 BPM

## 2022-09-14 DIAGNOSIS — F17.200 TOBACCO DEPENDENCE: ICD-10-CM

## 2022-09-14 DIAGNOSIS — Z12.11 COLON CANCER SCREENING: ICD-10-CM

## 2022-09-14 DIAGNOSIS — Z00.00 WELL ADULT EXAM: Primary | ICD-10-CM

## 2022-09-14 DIAGNOSIS — K64.9 HEMORRHOIDS, UNSPECIFIED HEMORRHOID TYPE: ICD-10-CM

## 2022-09-14 DIAGNOSIS — D22.9 ATYPICAL MOLE: ICD-10-CM

## 2022-09-14 LAB
ALT SERPL-CCNC: 31 U/L
ANION GAP SERPL CALC-SCNC: 4 MMOL/L (ref 0–18)
AST SERPL-CCNC: 24 U/L (ref 15–37)
BUN BLD-MCNC: 13 MG/DL (ref 7–18)
CALCIUM BLD-MCNC: 9.8 MG/DL (ref 8.5–10.1)
CHLORIDE SERPL-SCNC: 110 MMOL/L (ref 98–112)
CHOLEST SERPL-MCNC: 153 MG/DL (ref ?–200)
CO2 SERPL-SCNC: 27 MMOL/L (ref 21–32)
COMPLEXED PSA SERPL-MCNC: 0.75 NG/ML (ref ?–4)
CREAT BLD-MCNC: 0.83 MG/DL
ERYTHROCYTE [DISTWIDTH] IN BLOOD BY AUTOMATED COUNT: 13.6 %
FASTING PATIENT LIPID ANSWER: YES
FASTING STATUS PATIENT QL REPORTED: YES
GFR SERPLBLD BASED ON 1.73 SQ M-ARVRAT: 102 ML/MIN/1.73M2 (ref 60–?)
GLUCOSE BLD-MCNC: 83 MG/DL (ref 70–99)
HCT VFR BLD AUTO: 45.8 %
HDLC SERPL-MCNC: 38 MG/DL (ref 40–59)
HGB BLD-MCNC: 16.8 G/DL
LDLC SERPL CALC-MCNC: 92 MG/DL (ref ?–100)
MCH RBC QN AUTO: 34 PG (ref 26–34)
MCHC RBC AUTO-ENTMCNC: 36.7 G/DL (ref 31–37)
MCV RBC AUTO: 92.7 FL
NONHDLC SERPL-MCNC: 115 MG/DL (ref ?–130)
OSMOLALITY SERPL CALC.SUM OF ELEC: 291 MOSM/KG (ref 275–295)
PLATELET # BLD AUTO: 300 10(3)UL (ref 150–450)
POTASSIUM SERPL-SCNC: 4.7 MMOL/L (ref 3.5–5.1)
RBC # BLD AUTO: 4.94 X10(6)UL
SODIUM SERPL-SCNC: 141 MMOL/L (ref 136–145)
TRIGL SERPL-MCNC: 128 MG/DL (ref 30–149)
TSI SER-ACNC: 1.14 MIU/ML (ref 0.36–3.74)
VLDLC SERPL CALC-MCNC: 21 MG/DL (ref 0–30)
WBC # BLD AUTO: 7.8 X10(3) UL (ref 4–11)

## 2022-09-14 PROCEDURE — 90715 TDAP VACCINE 7 YRS/> IM: CPT | Performed by: FAMILY MEDICINE

## 2022-09-14 PROCEDURE — 80048 BASIC METABOLIC PNL TOTAL CA: CPT | Performed by: FAMILY MEDICINE

## 2022-09-14 PROCEDURE — 84450 TRANSFERASE (AST) (SGOT): CPT | Performed by: FAMILY MEDICINE

## 2022-09-14 PROCEDURE — 99396 PREV VISIT EST AGE 40-64: CPT | Performed by: FAMILY MEDICINE

## 2022-09-14 PROCEDURE — 80061 LIPID PANEL: CPT | Performed by: FAMILY MEDICINE

## 2022-09-14 PROCEDURE — 90471 IMMUNIZATION ADMIN: CPT | Performed by: FAMILY MEDICINE

## 2022-09-14 PROCEDURE — 3008F BODY MASS INDEX DOCD: CPT | Performed by: FAMILY MEDICINE

## 2022-09-14 PROCEDURE — 84153 ASSAY OF PSA TOTAL: CPT | Performed by: FAMILY MEDICINE

## 2022-09-14 PROCEDURE — 85027 COMPLETE CBC AUTOMATED: CPT | Performed by: FAMILY MEDICINE

## 2022-09-14 PROCEDURE — 3074F SYST BP LT 130 MM HG: CPT | Performed by: FAMILY MEDICINE

## 2022-09-14 PROCEDURE — 3078F DIAST BP <80 MM HG: CPT | Performed by: FAMILY MEDICINE

## 2022-09-14 PROCEDURE — 84443 ASSAY THYROID STIM HORMONE: CPT | Performed by: FAMILY MEDICINE

## 2022-09-14 PROCEDURE — 84460 ALANINE AMINO (ALT) (SGPT): CPT | Performed by: FAMILY MEDICINE

## 2022-12-01 ENCOUNTER — OFFICE VISIT (OUTPATIENT)
Facility: LOCATION | Age: 57
End: 2022-12-01
Payer: COMMERCIAL

## 2022-12-01 VITALS — TEMPERATURE: 98 F | HEART RATE: 67 BPM

## 2022-12-01 DIAGNOSIS — K63.5 POLYP OF COLON, UNSPECIFIED PART OF COLON, UNSPECIFIED TYPE: ICD-10-CM

## 2022-12-01 DIAGNOSIS — L98.9 SKIN LESION: Primary | ICD-10-CM

## 2022-12-01 PROCEDURE — 99244 OFF/OP CNSLTJ NEW/EST MOD 40: CPT | Performed by: SURGERY

## 2022-12-01 PROCEDURE — 11400 EXC TR-EXT B9+MARG 0.5 CM<: CPT | Performed by: SURGERY

## 2022-12-01 PROCEDURE — 88305 TISSUE EXAM BY PATHOLOGIST: CPT | Performed by: SURGERY

## 2022-12-01 RX ORDER — POLYETHYLENE GLYCOL 3350, SODIUM CHLORIDE, SODIUM BICARBONATE, POTASSIUM CHLORIDE 420; 11.2; 5.72; 1.48 G/4L; G/4L; G/4L; G/4L
POWDER, FOR SOLUTION ORAL
Qty: 1 EACH | Refills: 0 | Status: SHIPPED | OUTPATIENT
Start: 2022-12-01

## 2022-12-10 ENCOUNTER — PATIENT MESSAGE (OUTPATIENT)
Dept: FAMILY MEDICINE CLINIC | Facility: CLINIC | Age: 57
End: 2022-12-10

## 2022-12-11 NOTE — TELEPHONE ENCOUNTER
From: Ceci Nguyen  To: Art Joiner MD  Sent: 12/10/2022 11:39 PM CST  Subject: Question regarding CBC, PLATELET; NO DIFFERENTIAL    I seem to have a history of high borderline mch and mchc results. Is this a concern?

## 2022-12-13 ENCOUNTER — OFFICE VISIT (OUTPATIENT)
Facility: LOCATION | Age: 57
End: 2022-12-13

## 2022-12-13 VITALS — TEMPERATURE: 97 F | HEART RATE: 70 BPM

## 2022-12-13 DIAGNOSIS — L98.9 SKIN LESION: Primary | ICD-10-CM

## 2022-12-13 PROCEDURE — 99024 POSTOP FOLLOW-UP VISIT: CPT | Performed by: SURGERY

## 2023-02-21 ENCOUNTER — OFFICE VISIT (OUTPATIENT)
Dept: FAMILY MEDICINE CLINIC | Facility: CLINIC | Age: 58
End: 2023-02-21
Payer: COMMERCIAL

## 2023-02-21 VITALS
SYSTOLIC BLOOD PRESSURE: 112 MMHG | BODY MASS INDEX: 25.2 KG/M2 | DIASTOLIC BLOOD PRESSURE: 82 MMHG | HEART RATE: 82 BPM | HEIGHT: 71 IN | TEMPERATURE: 98 F | RESPIRATION RATE: 16 BRPM | WEIGHT: 180 LBS | OXYGEN SATURATION: 96 %

## 2023-02-21 DIAGNOSIS — J02.9 ACUTE PHARYNGITIS, UNSPECIFIED ETIOLOGY: Primary | ICD-10-CM

## 2023-02-21 LAB
CONTROL LINE PRESENT WITH A CLEAR BACKGROUND (YES/NO): YES YES/NO
STREP GRP A CUL-SCR: NEGATIVE

## 2023-02-21 PROCEDURE — 3074F SYST BP LT 130 MM HG: CPT | Performed by: NURSE PRACTITIONER

## 2023-02-21 PROCEDURE — 3079F DIAST BP 80-89 MM HG: CPT | Performed by: NURSE PRACTITIONER

## 2023-02-21 PROCEDURE — 87081 CULTURE SCREEN ONLY: CPT | Performed by: NURSE PRACTITIONER

## 2023-02-21 PROCEDURE — 87880 STREP A ASSAY W/OPTIC: CPT | Performed by: NURSE PRACTITIONER

## 2023-02-21 PROCEDURE — 3008F BODY MASS INDEX DOCD: CPT | Performed by: NURSE PRACTITIONER

## 2023-02-21 PROCEDURE — 99213 OFFICE O/P EST LOW 20 MIN: CPT | Performed by: NURSE PRACTITIONER

## 2023-02-21 NOTE — PATIENT INSTRUCTIONS
-Strep throat culture sent to lab  -Push fluids and plenty of rest    -Chloraseptic throat spray    -OTC Tylenol/Ibuprofen as packet insert If no allergies  -Soothing cough drops as packet insert   -Good handwashing, to prevent spread of virus    -Face mask helps prevent viral infections    Follow up in 3-5 days for worsening symptoms with clinic or PCP

## 2023-04-28 ENCOUNTER — TELEPHONE (OUTPATIENT)
Facility: LOCATION | Age: 58
End: 2023-04-28

## 2023-04-28 RX ORDER — SODIUM, POTASSIUM,MAG SULFATES 17.5-3.13G
SOLUTION, RECONSTITUTED, ORAL ORAL
Qty: 1 EACH | Refills: 0 | Status: SHIPPED | OUTPATIENT
Start: 2023-04-28

## 2023-04-28 NOTE — TELEPHONE ENCOUNTER
Entered Suprep order for patient's colonoscopy with Dr. Gisel Gaspar on 5/4/23 at [facility].

## 2023-04-28 NOTE — TELEPHONE ENCOUNTER
Per [employer] no Arleth Chaudhry is required for cpt [address]  Tracking Number: [id number]

## 2023-05-04 ENCOUNTER — HOSPITAL ENCOUNTER (OUTPATIENT)
Facility: HOSPITAL | Age: 58
Setting detail: HOSPITAL OUTPATIENT SURGERY
Discharge: HOME | End: 2023-05-04
Attending: SURGERY | Admitting: SURGERY
Payer: COMMERCIAL

## 2023-05-04 ENCOUNTER — ANESTHESIA EVENT (OUTPATIENT)
Dept: ENDOSCOPY | Facility: HOSPITAL | Age: 58
End: 2023-05-04
Payer: COMMERCIAL

## 2023-05-04 ENCOUNTER — ANESTHESIA (OUTPATIENT)
Dept: ENDOSCOPY | Facility: HOSPITAL | Age: 58
End: 2023-05-04
Payer: COMMERCIAL

## 2023-05-04 VITALS
SYSTOLIC BLOOD PRESSURE: 130 MMHG | DIASTOLIC BLOOD PRESSURE: 88 MMHG | BODY MASS INDEX: 24.65 KG/M2 | WEIGHT: 180 LBS | OXYGEN SATURATION: 100 % | TEMPERATURE: 98 F | HEIGHT: 71.5 IN | RESPIRATION RATE: 16 BRPM | HEART RATE: 64 BPM

## 2023-05-04 DIAGNOSIS — K63.5 POLYP OF COLON, UNSPECIFIED PART OF COLON, UNSPECIFIED TYPE: ICD-10-CM

## 2023-05-04 PROCEDURE — 0DBN8ZX EXCISION OF SIGMOID COLON, VIA NATURAL OR ARTIFICIAL OPENING ENDOSCOPIC, DIAGNOSTIC: ICD-10-PCS | Performed by: SURGERY

## 2023-05-04 PROCEDURE — 88305 TISSUE EXAM BY PATHOLOGIST: CPT | Performed by: SURGERY

## 2023-05-04 RX ORDER — LIDOCAINE HYDROCHLORIDE 10 MG/ML
INJECTION, SOLUTION EPIDURAL; INFILTRATION; INTRACAUDAL; PERINEURAL AS NEEDED
Status: DISCONTINUED | OUTPATIENT
Start: 2023-05-04 | End: 2023-05-04 | Stop reason: SURG

## 2023-05-04 RX ORDER — NALOXONE HYDROCHLORIDE 0.4 MG/ML
80 INJECTION, SOLUTION INTRAMUSCULAR; INTRAVENOUS; SUBCUTANEOUS AS NEEDED
Status: DISCONTINUED | OUTPATIENT
Start: 2023-05-04 | End: 2023-05-04

## 2023-05-04 RX ORDER — SODIUM CHLORIDE, SODIUM LACTATE, POTASSIUM CHLORIDE, CALCIUM CHLORIDE 600; 310; 30; 20 MG/100ML; MG/100ML; MG/100ML; MG/100ML
INJECTION, SOLUTION INTRAVENOUS CONTINUOUS
Status: DISCONTINUED | OUTPATIENT
Start: 2023-05-04 | End: 2023-05-04

## 2023-05-04 RX ADMIN — SODIUM CHLORIDE, SODIUM LACTATE, POTASSIUM CHLORIDE, CALCIUM CHLORIDE: 600; 310; 30; 20 INJECTION, SOLUTION INTRAVENOUS at 09:03:00

## 2023-05-04 RX ADMIN — LIDOCAINE HYDROCHLORIDE 50 MG: 10 INJECTION, SOLUTION EPIDURAL; INFILTRATION; INTRACAUDAL; PERINEURAL at 08:27:00

## 2023-05-04 RX ADMIN — SODIUM CHLORIDE, SODIUM LACTATE, POTASSIUM CHLORIDE, CALCIUM CHLORIDE: 600; 310; 30; 20 INJECTION, SOLUTION INTRAVENOUS at 08:25:00

## 2023-05-04 NOTE — DISCHARGE INSTRUCTIONS

## 2023-05-04 NOTE — BRIEF OP NOTE
Pre-Operative Diagnosis: Polyp of colon, unspecified part of colon, unspecified type [K63.5]     Post-Operative Diagnosis: Polyp      Procedure Performed:   COLONOSCOPY W/ KELLY POLYPECTOMY    Surgeon(s) and Role:     Eduard Gandhi DO - Primary    Assistant(s):        Surgical Findings:      Specimen:      Estimated Blood Loss: No data recorded    Dictation Number:      Lydia Mabry DO  5/4/2023  9:08 AM

## 2023-05-04 NOTE — OPERATIVE REPORT
[facility]    PATIENT'S NAME: [facility]   ATTENDING PHYSICIAN: Yamilex Storm D.O.   OPERATING PHYSICIAN: Yamilex Storm D.O.   PATIENT ACCOUNT#:   [de-identified]    LOCATION:  [address]  MEDICAL RECORD #:   [id number]       YOB: 1965  ADMISSION DATE:       05/04/2023      OPERATION DATE:  05/04/2023    OPERATIVE REPORT      PREOPERATIVE DIAGNOSIS:  History of colon polyps. POSTOPERATIVE DIAGNOSIS:  Sigmoid colon polyp. PROCEDURE:  Pancolonoscopy to cecum with hot snare polypectomy, sigmoid colon. ANESTHESIA:  MAC. PREPARATION:  Tappan scale 2 ascending colon, 2 transverse, 2 descending colon. REPEAT COLONOSCOPY:  Three years. OPERATIVE TECHNIQUE:  An informed consent was previously obtained. The patient was taken to the endoscopy suite and placed in the left lateral decubitus position. Digital rectal examination was carried out, and Olympus colonoscope advanced into the rectal ampulla. Scope was traversed through rectum, sigmoid, descending, transverse, and ascending colons under direct visualization of the lumen. The base of the cecum was visualized. Scope was slowly withdrawn through the ascending, transverse, and descending colons, demonstrating no evidence of mucosal disease, masses, polypoid lesions, AVMs, or other abnormalities. Within the sigmoid colon patient had evidence of a sub millimeter sessile polyp that was hot snare polypectomied and retrieved. Scope was then slowly withdrawn through the remainder of the sigmoid colon and rectum, demonstrating no further evidence of mucosal disease. Scope was then removed. The patient tolerated the procedure well.      Dictated By Yamilex Storm D.O.  d: 05/04/2023 09:08:35  t: 05/04/2023 09:37:19  [facility] [id number]  EO/    cc: Tara Murphy D.O.

## 2023-05-09 ENCOUNTER — PATIENT OUTREACH (OUTPATIENT)
Facility: LOCATION | Age: 58
End: 2023-05-09

## 2024-09-06 ENCOUNTER — OFFICE VISIT (OUTPATIENT)
Dept: FAMILY MEDICINE CLINIC | Facility: CLINIC | Age: 59
End: 2024-09-06
Payer: COMMERCIAL

## 2024-09-06 ENCOUNTER — HOSPITAL ENCOUNTER (OUTPATIENT)
Dept: GENERAL RADIOLOGY | Age: 59
Discharge: HOME | End: 2024-09-06
Attending: FAMILY MEDICINE
Payer: COMMERCIAL

## 2024-09-06 VITALS
DIASTOLIC BLOOD PRESSURE: 68 MMHG | HEIGHT: 71.5 IN | HEART RATE: 67 BPM | TEMPERATURE: 98 F | SYSTOLIC BLOOD PRESSURE: 124 MMHG | BODY MASS INDEX: 26.18 KG/M2 | OXYGEN SATURATION: 98 % | WEIGHT: 191.19 LBS

## 2024-09-06 DIAGNOSIS — Z00.00 WELL ADULT EXAM: Primary | ICD-10-CM

## 2024-09-06 DIAGNOSIS — N52.9 ERECTILE DYSFUNCTION, UNSPECIFIED ERECTILE DYSFUNCTION TYPE: ICD-10-CM

## 2024-09-06 DIAGNOSIS — L98.9 SKIN LESION: ICD-10-CM

## 2024-09-06 DIAGNOSIS — M25.511 RIGHT SHOULDER PAIN, UNSPECIFIED CHRONICITY: ICD-10-CM

## 2024-09-06 LAB
ALT SERPL-CCNC: 26 U/L
ANION GAP SERPL CALC-SCNC: 5 MMOL/L (ref 0–18)
AST SERPL-CCNC: 29 U/L (ref ?–34)
BUN BLD-MCNC: 16 MG/DL (ref 9–23)
CALCIUM BLD-MCNC: 10 MG/DL (ref 8.7–10.4)
CHLORIDE SERPL-SCNC: 113 MMOL/L (ref 98–112)
CHOLEST SERPL-MCNC: 167 MG/DL (ref ?–200)
CO2 SERPL-SCNC: 24 MMOL/L (ref 21–32)
COMPLEXED PSA SERPL-MCNC: 0.53 NG/ML (ref ?–4)
CREAT BLD-MCNC: 0.65 MG/DL
EGFRCR SERPLBLD CKD-EPI 2021: 109 ML/MIN/1.73M2 (ref 60–?)
ERYTHROCYTE [DISTWIDTH] IN BLOOD BY AUTOMATED COUNT: 13.9 %
FASTING PATIENT LIPID ANSWER: YES
FASTING STATUS PATIENT QL REPORTED: YES
GLUCOSE BLD-MCNC: 85 MG/DL (ref 70–99)
HCT VFR BLD AUTO: 45 %
HDLC SERPL-MCNC: 36 MG/DL (ref 40–59)
HGB BLD-MCNC: 17.1 G/DL
LDLC SERPL CALC-MCNC: 105 MG/DL (ref ?–100)
MCH RBC QN AUTO: 34.3 PG (ref 26–34)
MCHC RBC AUTO-ENTMCNC: 38 G/DL (ref 31–37)
MCV RBC AUTO: 90.4 FL
NONHDLC SERPL-MCNC: 131 MG/DL (ref ?–130)
OSMOLALITY SERPL CALC.SUM OF ELEC: 294 MOSM/KG (ref 275–295)
PLATELET # BLD AUTO: 238 10(3)UL (ref 150–450)
POTASSIUM SERPL-SCNC: 4.1 MMOL/L (ref 3.5–5.1)
RBC # BLD AUTO: 4.98 X10(6)UL
SODIUM SERPL-SCNC: 142 MMOL/L (ref 136–145)
TRIGL SERPL-MCNC: 146 MG/DL (ref 30–149)
TSI SER-ACNC: 2.12 MIU/ML (ref 0.55–4.78)
VLDLC SERPL CALC-MCNC: 25 MG/DL (ref 0–30)
WBC # BLD AUTO: 6.4 X10(3) UL (ref 4–11)

## 2024-09-06 PROCEDURE — 80048 BASIC METABOLIC PNL TOTAL CA: CPT | Performed by: FAMILY MEDICINE

## 2024-09-06 PROCEDURE — 3008F BODY MASS INDEX DOCD: CPT | Performed by: FAMILY MEDICINE

## 2024-09-06 PROCEDURE — 80061 LIPID PANEL: CPT | Performed by: FAMILY MEDICINE

## 2024-09-06 PROCEDURE — 73030 X-RAY EXAM OF SHOULDER: CPT | Performed by: FAMILY MEDICINE

## 2024-09-06 PROCEDURE — 84153 ASSAY OF PSA TOTAL: CPT | Performed by: FAMILY MEDICINE

## 2024-09-06 PROCEDURE — 3074F SYST BP LT 130 MM HG: CPT | Performed by: FAMILY MEDICINE

## 2024-09-06 PROCEDURE — 84450 TRANSFERASE (AST) (SGOT): CPT | Performed by: FAMILY MEDICINE

## 2024-09-06 PROCEDURE — 84443 ASSAY THYROID STIM HORMONE: CPT | Performed by: FAMILY MEDICINE

## 2024-09-06 PROCEDURE — 3078F DIAST BP <80 MM HG: CPT | Performed by: FAMILY MEDICINE

## 2024-09-06 PROCEDURE — 99396 PREV VISIT EST AGE 40-64: CPT | Performed by: FAMILY MEDICINE

## 2024-09-06 PROCEDURE — 85027 COMPLETE CBC AUTOMATED: CPT | Performed by: FAMILY MEDICINE

## 2024-09-06 PROCEDURE — 84460 ALANINE AMINO (ALT) (SGPT): CPT | Performed by: FAMILY MEDICINE

## 2024-09-06 RX ORDER — SILDENAFIL 100 MG/1
100 TABLET, FILM COATED ORAL
Qty: 24 TABLET | Refills: 3 | Status: SHIPPED | OUTPATIENT
Start: 2024-09-06

## 2024-09-06 NOTE — PROGRESS NOTES
Lionel Montgomery is a 59 year old male.    CC:    Chief Complaint   Patient presents with    Physical       HPI:  Yearly PX    Last PSA:   Recent Results (from the past 900705 hour(s))   PSA Screen    Collection Time: 09/14/22 12:09 PM   Result Value Ref Range    Prostate Specific Antigen Screen 0.75 <=4.00 ng/mL     *Note: Due to a large number of results and/or encounters for the requested time period, some results have not been displayed. A complete set of results can be found in Results Review.        Last lipid:  Lab Results   Component Value Date    CHOLEST 153 09/14/2022    TRIG 128 09/14/2022    HDL 38 (L) 09/14/2022    LDL 92 09/14/2022    VLDL 21 09/14/2022    TCHDLRATIO 4.09 07/26/2017    NONHDLC 115 09/14/2022       Last Colon Cancer screening: Colonoscopy 05/04/2023 to be repeated 2026      Immunization History   Administered Date(s) Administered    TDAP 01/23/2012, 09/14/2022       There is no problem list on file for this patient.     R shoulder pains on/off for a few years. Can cause reduction in ROM. Does not awaken him all the time.     ED issues the past couple of years    Skin lesion on L posterior shouled    Allergies:  No Known Allergies   Current Meds:  Current Outpatient Medications   Medication Sig Dispense Refill    multivitamin Oral Tab Take 1 tablet by mouth daily.          History:  Past Medical History:    History of colon polyps    tubular adenoma and hyperplastic    Visual impairment    glasses      Past Surgical History:   Procedure Laterality Date    Colonoscopy N/A 9/13/2017    Procedure: COLONOSCOPY;  Surgeon: Ernesto Ralph DO;  Location:  ENDOSCOPY    Colonoscopy N/A 11/13/2019    Procedure: COLONOSCOPY;  Surgeon: Ernesto Ralph DO;  Location:  ENDOSCOPY    Colonoscopy N/A 5/4/2023    Procedure: COLONOSCOPY W/ HOTSNARE POLYPECTOMY;  Surgeon: Ernesto Ralph DO;  Location:  ENDOSCOPY    Other surgical history      R hip surgery, debridement     Tonsillectomy         Family History   Problem Relation Age of Onset    Cancer Father         skin    Prostate Cancer Father     Other (Other) Father         colon polyps    Cancer Mother         breast      Family Status   Relation Status    Fa Alive    Mo Alive      Social History     Socioeconomic History    Marital status:    Tobacco Use    Smoking status: Every Day     Current packs/day: 1.00     Types: Cigarettes    Smokeless tobacco: Never    Tobacco comments:     pack or less a day   Vaping Use    Vaping status: Never Used   Substance and Sexual Activity    Alcohol use: Yes     Alcohol/week: 0.0 standard drinks of alcohol     Comment: 6 drinks week    Drug use: No        ROS:  General: energy level stable      Vitals: /68   Pulse 67   Temp 98.2 °F (36.8 °C) (Temporal)   Ht 5' 11.5\" (1.816 m)   Wt 191 lb 3.2 oz (86.7 kg)   SpO2 98%   BMI 26.30 kg/m²    Reviewed by BECKA Murphy M.D.    Physical Exam:  GEN: well developed, well nourished, in no apparent distress  EYE: B conjunctiva and lids normal  HENT: normocephalic; normal nose, pharynx and TM's  NECK: No lymphadenopathy, thyromegaly or masses  CAR: S1, S2 normal, RRR; no S3, no S4; no click; murmur negative  PULM: clear to auscultation B, no accessory muscle use  GI: normal active BS+, soft, nondistended; no HSM; no masses; no bruits; no masses; nontender, no G/R/R   PSYCH: alert and oriented x 3; affect appropriate  SKIN: L posterior shoulder with an eraser head sized, purple lesion  EXTREMITIES: No clubbing, cyanosis or edema  GENITAL: not examined  LYMPH: no supraclavicular nodes  NEURO: Awake and alert. Normal speech and articulation. No facial droop or asymmetry. Moving all extremities equally. Symmetric B patellar DTRs  MS: Right Shoulder exam:  ROM: normal  Impingement sign: Neg  Drop arm test :Neg  Lift off test: Neg  Tenderness at bicipital groove: none  Tenderness at AC joint: none  Scapular winging: Neg       ASSESSMENT AND PLAN    1. Well adult  exam  Await results   - VENIPUNCTURE  - ALT(SGPT); Future  - AST (SGOT); Future  - Basic Metabolic Panel (8); Future  - CBC, Platelet; No Differential; Future  - PSA Total, Screen; Future  - Lipid Panel; Future  - TSH W Reflex To Free T4; Future    2. Right shoulder pain, unspecified chronicity  Await results   - XR SHOULDER, COMPLETE (MIN 2 VIEWS), RIGHT (CPT=73030); Future    3. Skin lesion    - Derm Referral - In Network    4. Erectile dysfunction, unspecified erectile dysfunction type  Viagra rx      No follow-ups on file.    Orders for this visit:    Orders Placed This Encounter   Procedures    ALT(SGPT)     Standing Status:   Future     Standing Expiration Date:   9/6/2025    AST (SGOT)     Standing Status:   Future     Standing Expiration Date:   9/6/2025    Basic Metabolic Panel (8)     Standing Status:   Future     Standing Expiration Date:   9/6/2025    CBC, Platelet; No Differential     Standing Status:   Future     Standing Expiration Date:   9/6/2025    PSA Total, Screen     Standing Status:   Future     Standing Expiration Date:   9/6/2025    Lipid Panel     Standing Status:   Future     Standing Expiration Date:   9/6/2025    TSH W Reflex To Free T4     Standing Status:   Future     Standing Expiration Date:   9/6/2025    VENIPUNCTURE     Order Specific Question:   Release to patient     Answer:   Immediate       DERM - INTERNAL  XR SHOULDER, COMPLETE (MIN 2 VIEWS), RIGHT (CPT=73030)    Meds & Refills for this Visit:  Requested Prescriptions     Signed Prescriptions Disp Refills    Sildenafil Citrate (VIAGRA) 100 MG Oral Tab 24 tablet 3     Sig: Take 1 tablet (100 mg total) by mouth daily as needed. He will use [facility]             Authorized by Bishop Murphy M.D.

## 2024-11-07 NOTE — H&P
Patient presents for colonoscopy. Patient underwent colonoscopy in 2017 with a total of 4 polyps removed. 2 from his base of his cecum adjacent to the appendiceal orifice and 2 of the ascending colon.   He currently denies complaints he denies change in b daily, Disp: 60 tablet, Rfl: 3  •  multivitamin Oral Tab, Take 1 tablet by mouth daily. , Disp: , Rfl:       Review of Systems  The Review of Systems has been reviewed by me during today.   Review of Systems   Constitutional: Negative for chills, diaphoresis Normal mood and appropriate affect. Skin. No jaundice. Normal skin turgor. No dominant lesions noted. Lymphatic.   No cervical, supraclavicular, or inguinal lymphadenopathy.                 Assessment   History of colonic polyps last colonoscopy 2017 Unknown

## (undated) DEVICE — REM POLYHESIVE ADULT PATIENT RETURN ELECTRODE: Brand: VALLEYLAB

## (undated) DEVICE — KIT ENDO ORCAPOD 160/180/190

## (undated) DEVICE — FORCEP BIOPSY RJ4 LG CAP W/ND

## (undated) DEVICE — 3M™ RED DOT™ MONITORING ELECTRODE WITH FOAM TAPE AND STICKY GEL, 50/BAG, 20/CASE, 72/PLT 2570: Brand: RED DOT™

## (undated) DEVICE — Device: Brand: DEFENDO AIR/WATER/SUCTION AND BIOPSY VALVE

## (undated) DEVICE — 1200CC GUARDIAN II: Brand: GUARDIAN

## (undated) DEVICE — GLOVE SURG TRIUMPH SZ 71/2

## (undated) DEVICE — FILTERLINE NASAL ADULT O2/CO2

## (undated) DEVICE — ENDOSCOPY PACK - LOWER: Brand: MEDLINE INDUSTRIES, INC.

## (undated) DEVICE — SNARE CAPTIFLEX MICRO-OVL OLY

## (undated) DEVICE — 10FT COMBINED O2 DELIVERY/CO2 MONITORING. FILTER WITH MICROSTREAM TYPE LUER: Brand: DUAL ADULT NASAL CANNULA

## (undated) DEVICE — TRAP 4 CPTR CHMBR N EZ INLN

## (undated) DEVICE — TRAP SPEC REMOVAL ETRAP 15CM

## (undated) DEVICE — SNARE CAPTI HEX STIFF SMALL

## (undated) DEVICE — BIOGUARD CLEANING ADAPTER

## (undated) NOTE — MR AVS SNAPSHOT
[address] [facility] [address]  [phone]               Thank you for choosing us for your health care visit with Kellie Linn MD.  We are glad to serve you and happy to provide you with this sum information, go to [email or web address] and click on the Sign Up Now link in the [employer] box. Enter your [employer] Activation Code exactly as it appears below along with your Zip Code and Date of Birth to complete the sign-up process.  If you do You don’t need to join a gym. Home exercises work great.  Put more priority on exercise in your life                    Visit [facility] online at  [email or web address].tn

## (undated) NOTE — Clinical Note
Time, I saw Migdalia Faster in the office today. As you know he has a history of hematochezia with clots in the stool he also has a family history of colon polyps. I am recommending patient undergo screening colonoscopy. We will schedule at next available date.

## (undated) NOTE — Clinical Note
I saw Veverly Janet in the office today, he presents for colonoscopy follow-up, he had a polyp removed 3 years ago. Additionally he has a suspicious nevus of his right knee. This was excised in the office today.   Thank you Indu Wilkinson